# Patient Record
Sex: MALE | Race: WHITE | Employment: FULL TIME | URBAN - METROPOLITAN AREA
[De-identification: names, ages, dates, MRNs, and addresses within clinical notes are randomized per-mention and may not be internally consistent; named-entity substitution may affect disease eponyms.]

---

## 2024-02-22 ENCOUNTER — OFFICE VISIT (OUTPATIENT)
Dept: SURGERY | Facility: HOSPITAL | Age: 49
End: 2024-02-22
Payer: COMMERCIAL

## 2024-02-22 ENCOUNTER — TELEPHONE (OUTPATIENT)
Age: 49
End: 2024-02-22

## 2024-02-22 VITALS
HEART RATE: 76 BPM | OXYGEN SATURATION: 98 % | RESPIRATION RATE: 18 BRPM | SYSTOLIC BLOOD PRESSURE: 150 MMHG | WEIGHT: 202.6 LBS | HEIGHT: 71 IN | DIASTOLIC BLOOD PRESSURE: 90 MMHG | TEMPERATURE: 98.4 F | BODY MASS INDEX: 28.36 KG/M2

## 2024-02-22 DIAGNOSIS — K40.20 NON-RECURRENT BILATERAL INGUINAL HERNIA WITHOUT OBSTRUCTION OR GANGRENE: Primary | ICD-10-CM

## 2024-02-22 PROCEDURE — 99203 OFFICE O/P NEW LOW 30 MIN: CPT | Performed by: SURGERY

## 2024-02-22 RX ORDER — FLUTICASONE PROPIONATE 50 MCG
2 SPRAY, SUSPENSION (ML) NASAL DAILY
COMMUNITY
Start: 2024-02-21

## 2024-02-22 RX ORDER — HYDROCHLOROTHIAZIDE 25 MG/1
TABLET ORAL DAILY
COMMUNITY
Start: 2024-02-20

## 2024-02-22 RX ORDER — MONTELUKAST SODIUM 10 MG/1
TABLET ORAL
COMMUNITY
Start: 2024-02-20

## 2024-02-22 RX ORDER — ENALAPRIL MALEATE 20 MG/1
TABLET ORAL 2 TIMES DAILY
COMMUNITY
Start: 2024-02-20

## 2024-02-22 RX ORDER — FLUTICASONE PROPIONATE AND SALMETEROL 250; 50 UG/1; UG/1
1 POWDER RESPIRATORY (INHALATION) DAILY
COMMUNITY
Start: 2023-12-28

## 2024-02-22 NOTE — TELEPHONE ENCOUNTER
Patient calling in to confirm he had a referral in his chart. Advised patient there was a referral for his appointment at 3:30 with Dr. Auguste.

## 2024-02-23 ENCOUNTER — TELEPHONE (OUTPATIENT)
Age: 49
End: 2024-02-23

## 2024-02-23 RX ORDER — SODIUM CHLORIDE, SODIUM LACTATE, POTASSIUM CHLORIDE, CALCIUM CHLORIDE 600; 310; 30; 20 MG/100ML; MG/100ML; MG/100ML; MG/100ML
125 INJECTION, SOLUTION INTRAVENOUS CONTINUOUS
Status: CANCELLED | OUTPATIENT
Start: 2024-03-05

## 2024-02-23 RX ORDER — CEFAZOLIN SODIUM 2 G/50ML
2000 SOLUTION INTRAVENOUS ONCE
Status: CANCELLED | OUTPATIENT
Start: 2024-03-06 | End: 2024-03-06

## 2024-02-27 ENCOUNTER — LAB (OUTPATIENT)
Dept: LAB | Facility: HOSPITAL | Age: 49
End: 2024-02-27
Payer: COMMERCIAL

## 2024-02-27 ENCOUNTER — APPOINTMENT (OUTPATIENT)
Dept: LAB | Facility: HOSPITAL | Age: 49
End: 2024-02-27
Payer: COMMERCIAL

## 2024-02-27 ENCOUNTER — HOSPITAL ENCOUNTER (OUTPATIENT)
Dept: RADIOLOGY | Facility: HOSPITAL | Age: 49
Discharge: HOME | End: 2024-02-27
Payer: COMMERCIAL

## 2024-02-27 DIAGNOSIS — K40.20 NON-RECURRENT BILATERAL INGUINAL HERNIA WITHOUT OBSTRUCTION OR GANGRENE: ICD-10-CM

## 2024-02-27 LAB
ANION GAP SERPL CALCULATED.3IONS-SCNC: 8 MMOL/L
BASOPHILS # BLD AUTO: 0.04 THOUSANDS/ÂΜL (ref 0–0.1)
BASOPHILS NFR BLD AUTO: 1 % (ref 0–1)
BILIRUB UR QL STRIP: NEGATIVE
BUN SERPL-MCNC: 11 MG/DL (ref 5–25)
CALCIUM SERPL-MCNC: 9.8 MG/DL (ref 8.4–10.2)
CHLORIDE SERPL-SCNC: 99 MMOL/L (ref 96–108)
CLARITY UR: CLEAR
CO2 SERPL-SCNC: 30 MMOL/L (ref 21–32)
COLOR UR: ABNORMAL
CREAT SERPL-MCNC: 0.9 MG/DL (ref 0.6–1.3)
EOSINOPHIL # BLD AUTO: 0.55 THOUSAND/ÂΜL (ref 0–0.61)
EOSINOPHIL NFR BLD AUTO: 6 % (ref 0–6)
ERYTHROCYTE [DISTWIDTH] IN BLOOD BY AUTOMATED COUNT: 12 % (ref 11.6–15.1)
EST. AVERAGE GLUCOSE BLD GHB EST-MCNC: 111 MG/DL
GFR SERPL CREATININE-BSD FRML MDRD: 99 ML/MIN/1.73SQ M
GLUCOSE SERPL-MCNC: 87 MG/DL (ref 65–140)
GLUCOSE UR STRIP-MCNC: NEGATIVE MG/DL
HBA1C MFR BLD: 5.5 %
HCT VFR BLD AUTO: 47.3 % (ref 36.5–49.3)
HGB BLD-MCNC: 16 G/DL (ref 12–17)
HGB UR QL STRIP.AUTO: NEGATIVE
IMM GRANULOCYTES # BLD AUTO: 0.03 THOUSAND/UL (ref 0–0.2)
IMM GRANULOCYTES NFR BLD AUTO: 0 % (ref 0–2)
KETONES UR STRIP-MCNC: NEGATIVE MG/DL
LEUKOCYTE ESTERASE UR QL STRIP: NEGATIVE
LYMPHOCYTES # BLD AUTO: 2.58 THOUSANDS/ÂΜL (ref 0.6–4.47)
LYMPHOCYTES NFR BLD AUTO: 29 % (ref 14–44)
MCH RBC QN AUTO: 29.7 PG (ref 26.8–34.3)
MCHC RBC AUTO-ENTMCNC: 33.8 G/DL (ref 31.4–37.4)
MCV RBC AUTO: 88 FL (ref 82–98)
MONOCYTES # BLD AUTO: 0.87 THOUSAND/ÂΜL (ref 0.17–1.22)
MONOCYTES NFR BLD AUTO: 10 % (ref 4–12)
NEUTROPHILS # BLD AUTO: 4.77 THOUSANDS/ÂΜL (ref 1.85–7.62)
NEUTS SEG NFR BLD AUTO: 54 % (ref 43–75)
NITRITE UR QL STRIP: NEGATIVE
NRBC BLD AUTO-RTO: 0 /100 WBCS
PH UR STRIP.AUTO: 7.5 [PH]
PLATELET # BLD AUTO: 282 THOUSANDS/UL (ref 149–390)
PMV BLD AUTO: 9.3 FL (ref 8.9–12.7)
POTASSIUM SERPL-SCNC: 3.5 MMOL/L (ref 3.5–5.3)
PROT UR STRIP-MCNC: NEGATIVE MG/DL
RBC # BLD AUTO: 5.39 MILLION/UL (ref 3.88–5.62)
SODIUM SERPL-SCNC: 137 MMOL/L (ref 135–147)
SP GR UR STRIP.AUTO: <1.005 (ref 1–1.03)
UROBILINOGEN UR STRIP-ACNC: <2 MG/DL
WBC # BLD AUTO: 8.84 THOUSAND/UL (ref 4.31–10.16)

## 2024-02-27 PROCEDURE — 36415 COLL VENOUS BLD VENIPUNCTURE: CPT

## 2024-02-27 PROCEDURE — 83036 HEMOGLOBIN GLYCOSYLATED A1C: CPT

## 2024-02-27 PROCEDURE — 85025 COMPLETE CBC W/AUTO DIFF WBC: CPT

## 2024-02-27 PROCEDURE — 71046 X-RAY EXAM CHEST 2 VIEWS: CPT

## 2024-02-27 PROCEDURE — 81003 URINALYSIS AUTO W/O SCOPE: CPT

## 2024-02-27 PROCEDURE — 93005 ELECTROCARDIOGRAM TRACING: CPT

## 2024-02-27 PROCEDURE — 80048 BASIC METABOLIC PNL TOTAL CA: CPT

## 2024-02-27 RX ORDER — CHOLECALCIFEROL (VITAMIN D3) 25 MCG
CAPSULE ORAL DAILY
COMMUNITY

## 2024-02-27 NOTE — PRE-PROCEDURE INSTRUCTIONS
Pre-Surgery Instructions:   Medication Instructions    Bioflavonoid Products (Vitamin C) CHEW Stop taking 7 days prior to surgery.    Cholecalciferol (Vitamin D-3) 25 MCG (1000 UT) CAPS Stop taking 7 days prior to surgery.    enalapril (VASOTEC) 20 mg tablet Take night before surgery    fluticasone (FLONASE) 50 mcg/act nasal spray Take day of surgery.    hydroCHLOROthiazide 25 mg tablet Hold day of surgery.    montelukast (SINGULAIR) 10 mg tablet Take day of surgery.    Wixela Inhub 250-50 MCG/ACT inhaler Take day of surgery.   Medication instructions for day surgery reviewed. Please use only a sip of water to take your instructed medications. Avoid all over the counter vitamins, supplements and NSAIDS for one week prior to surgery per anesthesia guidelines. Tylenol is ok to take as needed.     You will receive a call one business day prior to surgery with an arrival time and hospital directions. If your surgery is scheduled on a Monday, the hospital will be calling you on the Friday prior to your surgery. If you have not heard from anyone by 8pm, please call the hospital supervisor through the hospital  at [phone]. ([place] [phone] or [place] [phone]).    Do not eat or drink anything after midnight the night before your surgery, including candy, mints, lifesavers, or chewing gum. Do not drink alcohol 24hrs before your surgery. Try not to smoke at least 24hrs before your surgery.       Follow the pre surgery showering instructions as listed in the “My Surgical Experience Booklet” or otherwise provided by your surgeon's office. Do not use a blade to shave the surgical area 1 week before surgery. It is okay to use a clean electric clippers up to 24 hours before surgery. Do not apply any lotions, creams, including makeup, cologne, deodorant, or perfumes after showering on the day of your surgery. Do not use dry shampoo, hair spray, hair gel, or any type of hair products.     No contact lenses,  eye make-up, or artificial eyelashes. Remove nail polish, including gel polish, and any artificial, gel, or acrylic nails if possible. Remove all jewelry including rings and body piercing jewelry.     Wear causal clothing that is easy to take on and off. Consider your type of surgery.    Keep any valuables, jewelry, piercings at home. Please bring any specially ordered equipment (sling, braces) if indicated.    Arrange for a responsible person to drive you to and from the hospital on the day of your surgery. Please confirm the visitor policy for the day of your procedure when you receive your phone call with an arrival time.     Call the surgeon's office with any new illnesses, exposures, or additional questions prior to surgery.    Please reference your “My Surgical Experience Booklet” for additional information to prepare for your upcoming surgery.

## 2024-02-28 ENCOUNTER — ANESTHESIA EVENT (OUTPATIENT)
Dept: PERIOP | Facility: HOSPITAL | Age: 49
End: 2024-02-28
Payer: COMMERCIAL

## 2024-02-29 LAB
ATRIAL RATE: 76 BPM
P AXIS: 11 DEGREES
PR INTERVAL: 154 MS
QRS AXIS: 5 DEGREES
QRSD INTERVAL: 92 MS
QT INTERVAL: 372 MS
QTC INTERVAL: 418 MS
T WAVE AXIS: 7 DEGREES
VENTRICULAR RATE: 76 BPM

## 2024-02-29 PROCEDURE — 93010 ELECTROCARDIOGRAM REPORT: CPT | Performed by: INTERNAL MEDICINE

## 2024-02-29 RX ORDER — SODIUM CHLORIDE, SODIUM LACTATE, POTASSIUM CHLORIDE, CALCIUM CHLORIDE 600; 310; 30; 20 MG/100ML; MG/100ML; MG/100ML; MG/100ML
125 INJECTION, SOLUTION INTRAVENOUS CONTINUOUS
OUTPATIENT
Start: 2024-03-06

## 2024-02-29 RX ORDER — CEFAZOLIN SODIUM 2 G/50ML
2000 SOLUTION INTRAVENOUS ONCE
OUTPATIENT
Start: 2024-03-06 | End: 2024-03-06

## 2024-02-29 NOTE — H&P (VIEW-ONLY)
Assessment/Plan:   Chivo Perdomo is a 48 y.o.male who is here for Consult (Consult- possible LIH //Pt presents for LIH, he is starting to notice a bulge. Pt states that he ocassionally gets sharp pains. He has had no changes to eating, drinking, or bowel movements. Pt describes the pain as a dull aching. )  .    Plan: Bilateral inguinal hernia - discussed operative vs conservative mgt, surgical approaches, risks and benefits and patient has decided to proceed with laparoscopic bilateral inguinal hernia repair. I will schedule at their earliest convenience.      Preoperative Clearance: None    HPI:  Chivo Perdomo is a 48 y.o.male who was referred for evaluation of Consult (Consult- possible LIH //Pt presents for LIH, he is starting to notice a bulge. Pt states that he ocassionally gets sharp pains. He has had no changes to eating, drinking, or bowel movements. Pt describes the pain as a dull aching. )  .    Currently groin pain.     ROS:  General ROS: negative  negative for - chills, fatigue, fever or night sweats, weight loss  Respiratory ROS: no cough, shortness of breath, or wheezing  Cardiovascular ROS: no chest pain or dyspnea on exertion  Genito-Urinary ROS: no dysuria, trouble voiding, or hematuria  Musculoskeletal ROS: negative for - gait disturbance, joint pain or muscle pain  Neurological ROS: no TIA or stroke symptoms  Groin pain    [unfilled]  Ibuprofen    Current Outpatient Medications:     enalapril (VASOTEC) 20 mg tablet, 2 (two) times a day, Disp: , Rfl:     fluticasone (FLONASE) 50 mcg/act nasal spray, 2 sprays into each nostril daily, Disp: , Rfl:     hydroCHLOROthiazide 25 mg tablet, daily, Disp: , Rfl:     montelukast (SINGULAIR) 10 mg tablet, Take by mouth daily at bedtime, Disp: , Rfl:     Wixela Inhub 250-50 MCG/ACT inhaler, Inhale 1 puff in the morning, Disp: , Rfl:     Bioflavonoid Products (Vitamin C) CHEW, Chew in the morning, Disp: , Rfl:     Cholecalciferol (Vitamin D-3) 25 MCG (1000  "UT) CAPS, Take by mouth in the morning, Disp: , Rfl:   Past Medical History:   Diagnosis Date    Asthma     Hypertension     PONV (postoperative nausea and vomiting)      Past Surgical History:   Procedure Laterality Date    NASAL SINUS SURGERY      2002,2005, 2010,2015     History reviewed. No pertinent family history.   reports that he quit smoking about 36 years ago. His smoking use included cigarettes. He started smoking about 19 years ago. He has been exposed to tobacco smoke. He has never used smokeless tobacco. He reports current alcohol use of about 2.0 standard drinks of alcohol per week. He reports that he does not use drugs.    Labs:   Lab Results   Component Value Date    WBC 8.84 02/27/2024    HGB 16.0 02/27/2024     02/27/2024     No results found for: \"ALT\", \"AST\"  This SmartLink has not been configured with any valid records.       PHYSICAL EXAM  General Appearance:    Alert, cooperative, no distress,    Head:    Normocephalic without obvious abnormality   Eyes:    PERRL, conjunctiva/corneas clear, EOM's intact        Neck:   Supple, no adenopathy, no JVD   Back:     Symmetric, no spinal or CVA tenderness   Lungs:     Clear to auscultation bilaterally, no wheezing or rhonchi   Heart:    Regular rate and rhythm, S1 and S2 normal, no murmur   Abdomen:     Soft +BS ND NT + b/l ing hernias   Extremities:   Extremities normal. No clubbing, cyanosis or edema   Psych:   Normal Affect, AOx3.    Neurologic:  Skin:   CNII-XII intact. Strength symmetric, speech intact    Warm, dry, intact, no visible rashes or lesions         Physical Exam              Some portions of this record may have been generated with voice recognition software. There may be translation, syntax,  or grammatical errors. Occasional wrong word or \"sound-a-like\" substitutions may have occurred due to the inherent limitations of the voice recognition software. Read the chart carefully and recognize, using context, where substitutions " may have occurred. If you have any questions, please contact the dictating provider for clarification or correction, as needed. This encounter has been coded by a non-certified coder.

## 2024-03-05 PROBLEM — I10 HTN (HYPERTENSION): Status: ACTIVE | Noted: 2024-03-05

## 2024-03-05 PROBLEM — J45.909 ASTHMA: Status: ACTIVE | Noted: 2024-03-05

## 2024-03-05 PROBLEM — R11.2 PONV (POSTOPERATIVE NAUSEA AND VOMITING): Status: ACTIVE | Noted: 2024-03-05

## 2024-03-05 PROBLEM — Z98.890 PONV (POSTOPERATIVE NAUSEA AND VOMITING): Status: ACTIVE | Noted: 2024-03-05

## 2024-03-06 ENCOUNTER — ANESTHESIA (OUTPATIENT)
Dept: PERIOP | Facility: HOSPITAL | Age: 49
End: 2024-03-06
Payer: COMMERCIAL

## 2024-03-06 ENCOUNTER — HOSPITAL ENCOUNTER (OUTPATIENT)
Facility: HOSPITAL | Age: 49
Setting detail: OUTPATIENT SURGERY
Discharge: HOME | End: 2024-03-06
Attending: SURGERY | Admitting: SURGERY
Payer: COMMERCIAL

## 2024-03-06 VITALS
RESPIRATION RATE: 18 BRPM | HEART RATE: 80 BPM | OXYGEN SATURATION: 94 % | WEIGHT: 199 LBS | DIASTOLIC BLOOD PRESSURE: 88 MMHG | TEMPERATURE: 98.2 F | SYSTOLIC BLOOD PRESSURE: 121 MMHG | HEIGHT: 71 IN | BODY MASS INDEX: 27.86 KG/M2

## 2024-03-06 DIAGNOSIS — Z98.890 S/P HERNIA REPAIR: Primary | ICD-10-CM

## 2024-03-06 DIAGNOSIS — Z87.19 S/P HERNIA REPAIR: Primary | ICD-10-CM

## 2024-03-06 PROBLEM — K40.20 NON-RECURRENT BILATERAL INGUINAL HERNIA WITHOUT OBSTRUCTION OR GANGRENE: Status: ACTIVE | Noted: 2024-03-06

## 2024-03-06 PROCEDURE — C1781 MESH (IMPLANTABLE): HCPCS | Performed by: SURGERY

## 2024-03-06 PROCEDURE — 49650 LAP ING HERNIA REPAIR INIT: CPT | Performed by: PHYSICIAN ASSISTANT

## 2024-03-06 PROCEDURE — 49650 LAP ING HERNIA REPAIR INIT: CPT | Performed by: SURGERY

## 2024-03-06 DEVICE — BARD 3DMAX MESH LEFT LARGE
Type: IMPLANTABLE DEVICE | Site: INGUINAL | Status: FUNCTIONAL
Brand: BARD 3DMAX MESH

## 2024-03-06 DEVICE — BARD 3DMAX MESH RIGHT LARGE
Type: IMPLANTABLE DEVICE | Site: INGUINAL | Status: FUNCTIONAL
Brand: BARD 3DMAX MESH

## 2024-03-06 RX ORDER — MAGNESIUM HYDROXIDE 1200 MG/15ML
LIQUID ORAL AS NEEDED
Status: DISCONTINUED | OUTPATIENT
Start: 2024-03-06 | End: 2024-03-06 | Stop reason: HOSPADM

## 2024-03-06 RX ORDER — ONDANSETRON 2 MG/ML
INJECTION INTRAMUSCULAR; INTRAVENOUS AS NEEDED
Status: DISCONTINUED | OUTPATIENT
Start: 2024-03-06 | End: 2024-03-06

## 2024-03-06 RX ORDER — ONDANSETRON 2 MG/ML
4 INJECTION INTRAMUSCULAR; INTRAVENOUS ONCE AS NEEDED
Status: DISCONTINUED | OUTPATIENT
Start: 2024-03-06 | End: 2024-03-06 | Stop reason: HOSPADM

## 2024-03-06 RX ORDER — MIDAZOLAM HYDROCHLORIDE 2 MG/2ML
INJECTION, SOLUTION INTRAMUSCULAR; INTRAVENOUS AS NEEDED
Status: DISCONTINUED | OUTPATIENT
Start: 2024-03-06 | End: 2024-03-06

## 2024-03-06 RX ORDER — PHENYLEPHRINE HCL IN 0.9% NACL 1 MG/10 ML
SYRINGE (ML) INTRAVENOUS AS NEEDED
Status: DISCONTINUED | OUTPATIENT
Start: 2024-03-06 | End: 2024-03-06

## 2024-03-06 RX ORDER — OXYCODONE HYDROCHLORIDE 5 MG/1
5 TABLET ORAL EVERY 4 HOURS PRN
Qty: 15 TABLET | Refills: 0 | Status: SHIPPED | OUTPATIENT
Start: 2024-03-06 | End: 2024-03-09

## 2024-03-06 RX ORDER — LIDOCAINE HYDROCHLORIDE 10 MG/ML
0.5 INJECTION, SOLUTION EPIDURAL; INFILTRATION; INTRACAUDAL; PERINEURAL ONCE AS NEEDED
Status: DISCONTINUED | OUTPATIENT
Start: 2024-03-06 | End: 2024-03-06 | Stop reason: HOSPADM

## 2024-03-06 RX ORDER — FENTANYL CITRATE 50 UG/ML
INJECTION, SOLUTION INTRAMUSCULAR; INTRAVENOUS AS NEEDED
Status: DISCONTINUED | OUTPATIENT
Start: 2024-03-06 | End: 2024-03-06

## 2024-03-06 RX ORDER — ACETAMINOPHEN 325 MG/1
650 TABLET ORAL EVERY 6 HOURS PRN
Status: DISCONTINUED | OUTPATIENT
Start: 2024-03-06 | End: 2024-03-06 | Stop reason: HOSPADM

## 2024-03-06 RX ORDER — ACETAMINOPHEN 325 MG/1
650 TABLET ORAL EVERY 6 HOURS PRN
Start: 2024-03-06

## 2024-03-06 RX ORDER — ONDANSETRON 2 MG/ML
4 INJECTION INTRAMUSCULAR; INTRAVENOUS EVERY 6 HOURS PRN
Status: DISCONTINUED | OUTPATIENT
Start: 2024-03-06 | End: 2024-03-06 | Stop reason: HOSPADM

## 2024-03-06 RX ORDER — HYDROMORPHONE HCL/PF 1 MG/ML
0.5 SYRINGE (ML) INJECTION
Status: DISCONTINUED | OUTPATIENT
Start: 2024-03-06 | End: 2024-03-06 | Stop reason: HOSPADM

## 2024-03-06 RX ORDER — SODIUM CHLORIDE, SODIUM LACTATE, POTASSIUM CHLORIDE, CALCIUM CHLORIDE 600; 310; 30; 20 MG/100ML; MG/100ML; MG/100ML; MG/100ML
125 INJECTION, SOLUTION INTRAVENOUS CONTINUOUS
Status: DISCONTINUED | OUTPATIENT
Start: 2024-03-06 | End: 2024-03-06 | Stop reason: HOSPADM

## 2024-03-06 RX ORDER — OXYCODONE HYDROCHLORIDE 5 MG/1
10 TABLET ORAL EVERY 4 HOURS PRN
Status: DISCONTINUED | OUTPATIENT
Start: 2024-03-06 | End: 2024-03-06 | Stop reason: HOSPADM

## 2024-03-06 RX ORDER — DEXAMETHASONE SODIUM PHOSPHATE 10 MG/ML
INJECTION, SOLUTION INTRAMUSCULAR; INTRAVENOUS AS NEEDED
Status: DISCONTINUED | OUTPATIENT
Start: 2024-03-06 | End: 2024-03-06

## 2024-03-06 RX ORDER — PROPOFOL 10 MG/ML
INJECTION, EMULSION INTRAVENOUS AS NEEDED
Status: DISCONTINUED | OUTPATIENT
Start: 2024-03-06 | End: 2024-03-06

## 2024-03-06 RX ORDER — ROCURONIUM BROMIDE 10 MG/ML
INJECTION, SOLUTION INTRAVENOUS AS NEEDED
Status: DISCONTINUED | OUTPATIENT
Start: 2024-03-06 | End: 2024-03-06

## 2024-03-06 RX ORDER — OXYCODONE HYDROCHLORIDE 5 MG/1
5 TABLET ORAL EVERY 4 HOURS PRN
Status: DISCONTINUED | OUTPATIENT
Start: 2024-03-06 | End: 2024-03-06 | Stop reason: HOSPADM

## 2024-03-06 RX ORDER — CEFAZOLIN SODIUM 2 G/50ML
2000 SOLUTION INTRAVENOUS ONCE
Status: COMPLETED | OUTPATIENT
Start: 2024-03-06 | End: 2024-03-06

## 2024-03-06 RX ADMIN — ONDANSETRON 4 MG: 2 INJECTION INTRAMUSCULAR; INTRAVENOUS at 08:56

## 2024-03-06 RX ADMIN — CEFAZOLIN SODIUM 2000 MG: 2 SOLUTION INTRAVENOUS at 09:02

## 2024-03-06 RX ADMIN — Medication 100 MCG: at 09:39

## 2024-03-06 RX ADMIN — SODIUM CHLORIDE, SODIUM LACTATE, POTASSIUM CHLORIDE, AND CALCIUM CHLORIDE 125 ML/HR: .6; .31; .03; .02 INJECTION, SOLUTION INTRAVENOUS at 08:04

## 2024-03-06 RX ADMIN — MIDAZOLAM 2 MG: 1 INJECTION INTRAMUSCULAR; INTRAVENOUS at 08:52

## 2024-03-06 RX ADMIN — PROPOFOL 200 MG: 10 INJECTION, EMULSION INTRAVENOUS at 08:56

## 2024-03-06 RX ADMIN — SUGAMMADEX 200 MG: 100 INJECTION, SOLUTION INTRAVENOUS at 09:18

## 2024-03-06 RX ADMIN — ROCURONIUM BROMIDE 20 MG: 10 INJECTION, SOLUTION INTRAVENOUS at 09:26

## 2024-03-06 RX ADMIN — Medication 200 MCG: at 09:45

## 2024-03-06 RX ADMIN — SODIUM CHLORIDE, SODIUM LACTATE, POTASSIUM CHLORIDE, AND CALCIUM CHLORIDE: .6; .31; .03; .02 INJECTION, SOLUTION INTRAVENOUS at 09:57

## 2024-03-06 RX ADMIN — ROCURONIUM BROMIDE 50 MG: 10 INJECTION, SOLUTION INTRAVENOUS at 08:56

## 2024-03-06 RX ADMIN — FENTANYL CITRATE 100 MCG: 50 INJECTION, SOLUTION INTRAMUSCULAR; INTRAVENOUS at 08:56

## 2024-03-06 RX ADMIN — DEXAMETHASONE SODIUM PHOSPHATE 10 MG: 10 INJECTION, SOLUTION INTRAMUSCULAR; INTRAVENOUS at 08:56

## 2024-03-06 RX ADMIN — SODIUM CHLORIDE, SODIUM LACTATE, POTASSIUM CHLORIDE, AND CALCIUM CHLORIDE: .6; .31; .03; .02 INJECTION, SOLUTION INTRAVENOUS at 08:52

## 2024-03-06 NOTE — ANESTHESIA POSTPROCEDURE EVALUATION
Post-Op Assessment Note    CV Status:  Stable  Pain Score: 0    Pain management: adequate       Mental Status:  Sleepy and arousable   Hydration Status:  Euvolemic   PONV Controlled:  None   Airway Patency:  Patent  Airway: intubated     Post Op Vitals Reviewed: Yes    No anethesia notable event occurred.    Staff: CRNA               BP   128/84   Temp 98.5   Pulse 74   Resp 16   SpO2 96%

## 2024-03-06 NOTE — ANESTHESIA PROCEDURE NOTES
Anesthesia Notable Event    Date/Time: 3/6/2024 11:30 AM    Performed by: Sunil Crain MD  Authorized by: Sunil Crain MD

## 2024-03-06 NOTE — ANESTHESIA PREPROCEDURE EVALUATION
Procedure:  REPAIR HERNIA INGUINAL, LAPAROSCOPIC (Bilateral: Groin)    Relevant Problems   ANESTHESIA   (+) PONV (postoperative nausea and vomiting)      CARDIO   (+) HTN (hypertension)      PULMONARY   (+) Asthma   Stable.Singulair this am     Physical Exam    Airway    Mallampati score: II  TM Distance: >3 FB  Neck ROM: full     Dental   No notable dental hx     Cardiovascular      Pulmonary   Breath sounds clear to auscultation    Other Findings        Anesthesia Plan  ASA Score- 2     Anesthesia Type- general with ASA Monitors.         Additional Monitors:     Airway Plan: ETT.           Plan Factors-Exercise tolerance (METS): >4 METS.    Chart reviewed. EKG reviewed.  Existing labs reviewed. Patient summary reviewed.    Patient is not a current smoker.      Obstructive sleep apnea risk education given perioperatively.        Induction- intravenous.    Postoperative Plan-     Informed Consent- Anesthetic plan and risks discussed with patient and spouse.  I personally reviewed this patient with the CRNA. Discussed and agreed on the Anesthesia Plan with the CRNA..

## 2024-03-06 NOTE — INTERVAL H&P NOTE
H&P reviewed. After examining the patient I find no changes in the patients condition since the H&P had been written.    Vitals:    03/06/24 0754   BP: 144/88   Pulse: 71   Resp: 18   Temp: 98 °F (36.7 °C)   SpO2: 98%

## 2024-03-06 NOTE — DISCHARGE INSTR - AVS FIRST PAGE
[facility] [place]  Post-Operative Care Instructions  Dr. Sabino Auguste MD, [facility]  [phone]    1. General: You will feel pulling sensations around the wound or funny aches and pains around the incisions. This is normal. Even minor surgery is a change in your body and this is your body's way of reacting to it. If you have had abdominal surgery, it may help to support the incision with a small pillow or blanket for comfort when moving or coughing.    2. Wound care:  Okay to shower.  The glue will fall off over the next week or 2.   Use ice for the first 5 days after surgery.  Do not use for longer than 20 minutes at a time. Use ice 5 times per day.    3. Water: You may shower over the wounds. Do not bathe or use a pool or hot tub until cleared by the physician.   If you were discharged with a drain, make sure drain site is covered with plastic wrap before showering.    4. Activity: You may go up and down stairs, walk as much as you are comfortable, but walk at least 3 times each day. If you have had abdominal surgery, do not lift anything heavier than 15 lbs for the 1st 2 weeks and 25 lbs for weeks 3 and 4.     5. Diet: You may resume a regular diet. If you had a same-day surgery or overnight stay surgery, you may wish to eat lightly for a few days: soups, crackers, and sandwiches. You may resume a regular diet when ready.    6. Medications: Resume all of your previous medications, unless told otherwise by the doctor. Avoid aspirin products for 2-3 days after the date of surgery. You may, at that time, begin to take them again. Use Tylenol and Ibuprofen for pain control.  You may alternate these medications every 3 hours.  For example: you may take Tylenol at noon, Ibuprofen at 3:00 p.m., and Tylenol again at 6:00 p.m., etc.  You should use ice to assist with pain control as above.  You do not need to take narcotic pain medication unless you are having significant pain.       7. Driving: You  will need someone to drive you home on the day of surgery or discharge. Do not drive or make any important decisions while on narcotic pain medication or 24 hours and after anesthesia or sedation for surgery. Generally, you may drive when your off all narcotic pain medications and you are comfortable.     8. Upset Stomach: You may take Maalox, Tums, or similar items for an upset stomach. If your narcotic pain medication causes an upset stomach, do not take it on an empty stomach. Try taking it with at least some crackers or toast.     9. Constipation: Patients often experience constipation after surgery. You may take over-the-counter medication for this, such as Metamucil, Senokot, Dulcolax, milk of magnesia, etc. You may take a suppository unless you have had anorectal surgery such as a procedure on your hemorrhoids. If you experience significant nausea or vomiting after abdominal surgery, call the office before trying any of these medications.    10. Call the office: If you are experiencing any of the following: fevers above 101.5°, significant nausea or vomiting, if the wound develops drainage and/or there is excessive redness around the wound, or if you have significant diarrhea or other worsening symptoms.    11. Pain: You may be given a prescription for pain medication.  This will be sent to your pharmacy prior to discharge.    12. Sexual Activity: You may resume sexual activity when you feel ready and comfortable and your incision is sealed and healed without apparent infection risk.    13. Urination: If you have not urinated in 6 hours, go directly to the ER for evaluation for urinary retention.     14. Follow-up in 2 weeks.      ***READ ONLY IF YOU HAVE BEEN DISCHARGED WITH A URINARY CATHETER***  Moore Insertion for Post-Op Urinary Retention    - A prescription for Flomax will be sent to your pharmacy.  This should be taken daily while the urinary catheter remains in place.    You will not be given a  prescription for Flomax if your prostate has been removed.  If you are already taking Flomax, continue the medication as prescribed.    - We will send a message to the urology group who will contact you within the next 48 hours with further instructions and to schedule an appointment for voiding trial and catheter removal.  The urinary catheter will remain in place for approximately 1 week.  If you are not contacted within the next 48 hours please call our office to assist with scheduling your follow-up.    - If you have your own urologist, you should contact your physician the day after discharge for instructions and to schedule a voiding trial and catheter removal.

## 2024-03-06 NOTE — INTERIM OP NOTE
REPAIR BILATERAL INGUINAL HERNIA, LAPAROSCOPIC  Postoperative Note  PATIENT NAME: Chivo Perdomo  : 1975  MRN: [id number]  [facility] OR ROOM 01    Surgery Date: 3/6/2024    Preop Diagnosis:  Non-recurrent bilateral inguinal hernia without obstruction or gangrene [K40.20]    Post-Op Diagnosis Codes:     * Non-recurrent bilateral inguinal hernia without obstruction or gangrene [K40.20]    Procedure(s) (LRB):  REPAIR BILATERAL INGUINAL HERNIA, LAPAROSCOPIC (Bilateral)    Surgeons and Role:     * Sabino Auguste MD - Primary     * Stacy Mcbride PA-C - Assisting    Specimens:  * No specimens in log *    Estimated Blood Loss:   0 mL    Anesthesia Type:   General ETA    UO:100 ml    Findings:    As above  Complications:   None    Hernia Surgery Operative Note    Name: Chivo Perdomo    Gender: male    Age: 48 y.o.    Race:     BMI: Body mass index is 27.75 kg/m².    DIAGNOSIS: No diagnosis found.    Diabetes Mellitis: No    Coronary Heart Disease: No    Cancer: No    Steroid Use: No    Tobacco use: No   Last used:    Type: cigarettes   Frequency (per day): 1 ppd   Duration (years): 17    Alcohol use: Yes Minimal     Location of Hernia: right indirect inguinal   Length:2 cm  Width:2 cm  Primary: Yes  Recurrent: No   Number of recurrences:    Access: Laparoscope    Component Separation: No    Mesh:   Yes -  Type: Synthetic  Right large 3D MAX    Location of Hernia: left indirect inguinal   Length:2 cm  Width:2 cm  Primary: Yes  Recurrent: No   Number of recurrences:    Access: Laparoscope    Component Separation: No    Mesh:   Yes -  Type: Synthetic  Left large 3D MAX      Operative Time: 47 min               SIGNATURE: Stacy Mcbride PA-C   DATE: 2024   TIME: 10:06 AM

## 2024-03-08 NOTE — OP NOTE
Inguinal Hernia, Laparocopic, Procedure Note    Name: Chivo Perdomo   : 1975  MRN: [id number]  Date: 3/6/2024    Indications: The patient presented with a history of a bilateral, not reducible inguinal hernia.      Pre-operative Diagnosis: bilateral not reducible inguinal hernia    Post-operative Diagnosis: bilateral not reducible direct and/or indirect inguinal hernia    Procedure: Laparoscopic repair of bilateral inguinal hernia with mesh, Laparoscopic assisted bilateral TAP block    Surgeon: Sabino Auguste MD  Assistants: Stacy Mcbride PA-C, no qualified resident available.  PA was medically necessary for the surgical safety of the case, including suturing, retraction, hemostasis.    Anesthesia: General endotracheal anesthesia    Procedure Details   The patient was seen again in the Holding Room. The risks, benefits, complications, treatment options, and expected outcomes were discussed with the patient. The possibilities of reaction to medication, pulmonary aspiration, perforation of viscus, bleeding, postoperative short or long term nerve entrapment causing pain,recurrent infection, the need for additional procedures, and development of a complication requiring transfusion or further operation were discussed with the patient and/or family. There was concurrence with the proposed plan, and informed consent was obtained. The site of surgery was properly noted/marked. The patient was taken to the Operating Room, identified as Chivo Perdomo and the procedure verified as hernia repair. A Time Out was held and the above information confirmed.    The patient was prepped and draped in a sterile fashion.  A timeout was again performed.  Local anesthesia was used in the incision. An umbilical incision was made.  Dissection carried out to the fascia which was grasped with Kocher's and elevated. The fascia was incised and 0-Vicryl stay sutures were placed. A brenna trocar was inserted into the abdomen  and the abdomen was insufflated to appropriate pressure. Two additional 5mm trocars were placed lateral to rectus muscle approximately at the level of the umbilicus.  At this point the patient was placed into Trendelenburg position and noted to have bilateral inguinal hernia . The peritoneum was incised from the medial umbilical fold out laterally past the internal ring on the right. Using peanut the superior flap was dissected. Next the direct space was mobilized by exposing Praneeth's ligament all the way along its length to the pubic tubercle. If a direct hernia defect was seen this was dissected and reduced. If there was indirect hernia sac this was carefully mobilized off the cord structures with care to avoid injury to the gonadal vessels or spermatic cord. The remainder of the inferior flap was created. At this point Bard 3-D max mesh was selected and placed into the preperitoneal space. The mesh was secured inferiorly at Praneeth's. Medially at the pubic tubercle, and laterally at the anterior abdominal wall. Of note no clips were placed lateral to the gonadal vessels or inferior to the iliopubic tract. The peritoneum was closed using a running V-lock suture.     The peritoneum was incised from the medial umbilical fold out laterally past the internal ring on the left. Using peanut the superior flap was dissected. Next the direct space was mobilized by exposing Praneeth's ligament all the way along its length to the pubic tubercle. If a direct hernia defect was seen this was dissected and reduced. If there was indirect hernia sac this was carefully mobilized off the cord structures with care to avoid injury to the gonadal vessels or spermatic cord. The remainder of the inferior flap was created. At this point Bard 3-D max mesh was selected and placed into the preperitoneal space. The mesh was secured inferiorly at Praneeth's. Medially at the pubic tubercle, and laterally at the anterior abdominal wall. Of note no  clips were placed lateral to the gonadal vessels or inferior to the iliopubic tract. The peritoneum was closed using a running V-lock suture.       The umbilical trocor site was closed with an additional 0-vicryl and tying down the stay sutures   The wound was closed in multiple layers using 3-0 Vicryl sutures and the skin of all ports was closed using a 4-0 Monocryl subcuticular stitch. The wound was dressed with Histacryl.  The patient was anatomically correct at the end of the procedure.  The patient tolerated the procedure in good condition.    Instrument, sponge, and needle counts were correct prior to closure and at the conclusion of the case.    This text is generated with voice recognition software. There may be translation, syntax,  or grammatical errors. If you have any questions, please contact the dictating provider.     Findings: bilateral not reducible direct and/or indirect inguinal hernia    Estimated Blood Loss: Minimal       Specimens: All specimens were sent for pathology.   No specimens collected during this procedure.         Complications: None; patient tolerated the procedure well.           Disposition: PACU            Condition: stable    Signature:   Sabino Auguste MD  Date: 3/8/2024 Time: 12:25 PM

## 2024-03-20 NOTE — PROGRESS NOTES
Assessment/Plan:   Chivo Perdomo is a 48 y.o.male who comes in today for postoperative check after laparoscopic repair of bilateral inguinal hernias with mesh done on 3/6/2024.    Patient is currently doing well without problems.  He complains of occasional scrotal discomfort. He is tolerating a full diet and moving his bowels.  He is not currently taking any pain medication.  His appetite is good. He is tolerating activities with a lifting restriction of 15 lbs.  His incision sites are  intact.   He is interested in returning to work.    Abdominal exam is benign.  Incision sites are healing well.  Hernia repairs are intact    Patient does not require further surgical follow-up.  He should continue diet as tolerated.  He should continue to refrain from strenuous activities and lift nothing greater that 25 lbs until 4/6/2024.  At that time he may return to activities without restrictions.  He may remove remaining surgical glue from incision sites as it lifts from skin edges. He should continue to call with changes, questions, or concerns.      HPI:  Chivo Perdomo is a 48 y.o.male who comes in today for postoperative check after recent repair of bilateral inguinal hernias as above    Patient is currently doing well without problems.  He complains of occasional scrotal discomfort. He is tolerating a full diet and moving his bowels.  He is not currently taking any pain medication.  His appetite is good. He is tolerating activities with a lifting restriction of 15 lbs.  His incision sites are  intact.   He is interested in returning to work.      ROS:  General ROS: negative for - chills, fatigue, fever or night sweats, weight loss  Respiratory ROS: no cough, shortness of breath, or wheezing  Cardiovascular ROS: no chest pain or dyspnea on exertion  Abdomen ROS: as per HPI  Genito-Urinary ROS: no dysuria, trouble voiding, or hematuria  Musculoskeletal ROS: negative for - gait disturbance, joint pain or muscle  pain  Neurological ROS: no TIA or stroke symptoms  Skin ROS: surgical incisions    ALLERGIES  Ibuprofen    Current Outpatient Medications:     acetaminophen (TYLENOL) 325 mg tablet, Take 2 tablets (650 mg total) by mouth every 6 (six) hours as needed for mild pain or moderate pain, Disp: , Rfl:     Bioflavonoid Products (Vitamin C) CHEW, Chew in the morning, Disp: , Rfl:     Cholecalciferol (Vitamin D-3) 25 MCG (1000 UT) CAPS, Take by mouth in the morning, Disp: , Rfl:     enalapril (VASOTEC) 20 mg tablet, 2 (two) times a day, Disp: , Rfl:     fluticasone (FLONASE) 50 mcg/act nasal spray, 2 sprays into each nostril daily, Disp: , Rfl:     hydroCHLOROthiazide 25 mg tablet, daily, Disp: , Rfl:     montelukast (SINGULAIR) 10 mg tablet, Take by mouth daily at bedtime, Disp: , Rfl:     Wixela Inhub 250-50 MCG/ACT inhaler, Inhale 1 puff in the morning, Disp: , Rfl:   Past Medical History:   Diagnosis Date    Asthma     Hypertension     PONV (postoperative nausea and vomiting)      Past Surgical History:   Procedure Laterality Date    NASAL SINUS SURGERY      2002,2005, 2010,2015    CO LAPAROSCOPY SURG RPR INITIAL INGUINAL HERNIA Bilateral 3/6/2024    Procedure: REPAIR BILATERAL INGUINAL HERNIA, LAPAROSCOPIC;  Surgeon: Sabino Auguste MD;  Location: [facility];  Service: General     No family history on file.   reports that he quit smoking about 36 years ago. His smoking use included cigarettes. He started smoking about 19 years ago. He has been exposed to tobacco smoke. He has never used smokeless tobacco. He reports current alcohol use of about 2.0 standard drinks of alcohol per week. He reports that he does not use drugs.    PHYSICAL EXAM    Vitals:    03/22/24 0933   BP: 132/91   Pulse: (!) 108   Resp: 18   Temp: 98.2 °F (36.8 °C)   SpO2: 94%     Weight (last 2 days)       Date/Time Weight    03/22/24 0933 94.3 (208)        '    General: normal, cooperative, no distress  Abdominal: soft, nondistended, or  nontender  Hernia repairs are intact  Incision: clean, dry, and intact and healing well    Stacy Mcbride

## 2024-03-22 ENCOUNTER — OFFICE VISIT (OUTPATIENT)
Dept: SURGERY | Facility: HOSPITAL | Age: 49
End: 2024-03-22

## 2024-03-22 VITALS
HEART RATE: 108 BPM | SYSTOLIC BLOOD PRESSURE: 132 MMHG | TEMPERATURE: 98.2 F | DIASTOLIC BLOOD PRESSURE: 91 MMHG | WEIGHT: 208 LBS | BODY MASS INDEX: 29.12 KG/M2 | RESPIRATION RATE: 18 BRPM | OXYGEN SATURATION: 94 % | HEIGHT: 71 IN

## 2024-03-22 DIAGNOSIS — Z98.890 POST-OPERATIVE STATE: Primary | ICD-10-CM

## 2024-03-22 PROCEDURE — 99024 POSTOP FOLLOW-UP VISIT: CPT | Performed by: PHYSICIAN ASSISTANT

## 2024-03-22 NOTE — LETTER
March 22, 2024     Patient: Chivo Perdomo  YOB: 1975  Date of Visit: 3/22/2024      To Whom it May Concern:    Chivo Perdomo is under my professional care. Chivo was seen in my office on 3/22/2024. Chivo may return to work on 3/25/2024 with light duty and lifting restriction of 25 pounds.  He may return to full work duties without restrictions as of 4/6/2025.    If you have any questions or concerns, please don't hesitate to call.         Sincerely,          Stacy Mcbride PA-C        CC: No Recipients

## (undated) DEVICE — METZENBAUM ADTEC SINGLE USE DISSECTING SCISSORS, SHAFT ONLY, MONOPOLAR, CURVED TO LEFT, WORKING LENGTH: 12 1/4", (310 MM), DIAM. 5 MM, INSULATED, DOUBLE ACTION, STERILE, DISPOSABLE, PACKAGE OF 10 PIECES: Brand: AESCULAP

## (undated) DEVICE — TROCAR: Brand: KII® SLEEVE

## (undated) DEVICE — STRAIGHT HERNIA STAPLER: Brand: MULTIFIRE ENDO HERNIA

## (undated) DEVICE — SYRINGE CATH TIP 50ML

## (undated) DEVICE — GLOVE SRG BIOGEL ECLIPSE 8

## (undated) DEVICE — INTENDED FOR TISSUE SEPARATION, AND OTHER PROCEDURES THAT REQUIRE A SHARP SURGICAL BLADE TO PUNCTURE OR CUT.: Brand: BARD-PARKER SAFETY BLADES SIZE 11, STERILE

## (undated) DEVICE — CHLORAPREP HI-LITE 26ML ORANGE

## (undated) DEVICE — ENDOPATH 5MM ENDOSCOPIC BLUNT TIP DISSECTORS (12 POUCHES CONTAINING 3 DISSECTORS EACH): Brand: ENDOPATH

## (undated) DEVICE — SUT VICRYL 0 UR-6 27 IN J603H

## (undated) DEVICE — ADHESIVE SKIN HIGH VISCOSITY EXOFIN 1ML

## (undated) DEVICE — ALLENTOWN LAP CHOLE APP PACK: Brand: CARDINAL HEALTH

## (undated) DEVICE — GLOVE INDICATOR PI UNDERGLOVE SZ 8 BLUE

## (undated) DEVICE — SCD SEQUENTIAL COMPRESSION COMFORT SLEEVE MEDIUM KNEE LENGTH: Brand: KENDALL SCD

## (undated) DEVICE — SUT MONOCRYL 4-0 PS-2 27 IN Y426H

## (undated) DEVICE — TROCAR: Brand: KII FIOS FIRST ENTRY

## (undated) DEVICE — INSUFFLATION TUBING PRIMFLO

## (undated) DEVICE — ABSORBABLE WOUND CLOSURE DEVICE: Brand: V-LOC 90

## (undated) DEVICE — DRAPE EQUIPMENT RF WAND

## (undated) DEVICE — GLOVE INDICATOR PI UNDERGLOVE SZ 6.5 BLUE

## (undated) DEVICE — GLOVE SRG BIOGEL 6.5

## (undated) DEVICE — NEPTUNE E-SEP SMOKE EVACUATION PENCIL, COATED, 70MM BLADE, PUSH BUTTON SWITCH: Brand: NEPTUNE E-SEP

## (undated) DEVICE — PAD GROUNDING DUAL ADULT

## (undated) DEVICE — TROCARS: Brand: KII® BALLOON BLUNT TIP SYSTEM

## (undated) DEVICE — TRAY FOLEY 16FR URIMETER SURESTEP

## (undated) DEVICE — ELECTRODE BLADE MOD E-Z CLEAN  2.75IN 7CM -0012AM

## (undated) DEVICE — BLUE HEAT SCOPE WARMER

## (undated) DEVICE — NEEDLE HYPO 22G X 1-1/2 IN

## (undated) DEVICE — SYRINGE 10ML LL

## (undated) DEVICE — DECANTER: Brand: UNBRANDED